# Patient Record
Sex: MALE | Race: WHITE | NOT HISPANIC OR LATINO
[De-identification: names, ages, dates, MRNs, and addresses within clinical notes are randomized per-mention and may not be internally consistent; named-entity substitution may affect disease eponyms.]

---

## 2023-08-04 ENCOUNTER — DASHBOARD ENCOUNTERS (OUTPATIENT)
Age: 83
End: 2023-08-04

## 2023-08-04 ENCOUNTER — OFFICE VISIT (OUTPATIENT)
Dept: URBAN - NONMETROPOLITAN AREA CLINIC 11 | Facility: CLINIC | Age: 83
End: 2023-08-04
Payer: MEDICARE

## 2023-08-04 ENCOUNTER — LAB OUTSIDE AN ENCOUNTER (OUTPATIENT)
Dept: URBAN - NONMETROPOLITAN AREA CLINIC 11 | Facility: CLINIC | Age: 83
End: 2023-08-04

## 2023-08-04 VITALS
OXYGEN SATURATION: 95 % | SYSTOLIC BLOOD PRESSURE: 111 MMHG | TEMPERATURE: 97.7 F | HEART RATE: 81 BPM | WEIGHT: 105.2 LBS | HEIGHT: 71 IN | BODY MASS INDEX: 14.73 KG/M2 | DIASTOLIC BLOOD PRESSURE: 57 MMHG

## 2023-08-04 DIAGNOSIS — R63.4 WEIGHT LOSS: ICD-10-CM

## 2023-08-04 DIAGNOSIS — R10.84 ABDOMINAL CRAMPING, GENERALIZED: ICD-10-CM

## 2023-08-04 DIAGNOSIS — K50.80 CROHN'S COLITIS: ICD-10-CM

## 2023-08-04 PROBLEM — 34000006: Status: ACTIVE | Noted: 2023-08-04

## 2023-08-04 PROBLEM — 386618008: Status: ACTIVE | Noted: 2023-08-04

## 2023-08-04 PROBLEM — 89362005: Status: ACTIVE | Noted: 2023-08-04

## 2023-08-04 PROBLEM — 54586004: Status: ACTIVE | Noted: 2023-08-04

## 2023-08-04 PROCEDURE — 99204 OFFICE O/P NEW MOD 45 MIN: CPT | Performed by: NURSE PRACTITIONER

## 2023-08-04 RX ORDER — AMLODIPINE BESYLATE 2.5 MG/1
TAKE ONE TABLET BY MOUTH ONE TIME DAILY STOP LISINOPRIL TABLET ORAL
Qty: 90 UNSPECIFIED | Refills: 1 | Status: ACTIVE | COMMUNITY

## 2023-08-04 RX ORDER — ASPIRIN 81 MG/1
TAKE ONE TABLET BY MOUTH ONE TIME DAILY TABLET, COATED ORAL
Qty: 90 UNSPECIFIED | Refills: 3 | Status: ACTIVE | COMMUNITY

## 2023-08-04 RX ORDER — ACYCLOVIR 800 MG/1
TAKE ONE TABLET BY MOUTH EVERY 4 HOURS FOR 10 DAYS TABLET ORAL
Qty: 60 UNSPECIFIED | Refills: 0 | Status: ACTIVE | COMMUNITY

## 2023-08-04 RX ORDER — ROSUVASTATIN CALCIUM 20 MG/1
TAKE ONE TABLET BY MOUTH ONE TIME DAILY TABLET, FILM COATED ORAL
Qty: 90 UNSPECIFIED | Refills: 3 | Status: ACTIVE | COMMUNITY

## 2023-08-04 RX ORDER — FLUTICASONE FUROATE, UMECLIDINIUM BROMIDE AND VILANTEROL TRIFENATATE 100; 62.5; 25 UG/1; UG/1; UG/1
INHALE ONE PUFF BY MOUTH ONE TIME DAILY POWDER RESPIRATORY (INHALATION)
Qty: 60 UNSPECIFIED | Refills: 1 | Status: ACTIVE | COMMUNITY

## 2023-08-04 NOTE — HPI-TODAY'S VISIT:
82 year old male with a history of Chron's disease, presents for evaluation of abdominal pain.  The patient was diagnosed in his 30's and he has never been on medication for Crohn's. Lower abdominal pain has occurred since 2023. The pain is dull most of the time, but occasionally it is tight and sharp up to a 9.  Bowel movements are every 1-2 days of loose stools which has been his baseline for years. He had shingles and the  flu in 2023. CT and US in 3/2023 showed simple liver cysts and mild sigmoid wall thickening. The patient quit excessive drining 18 years ago. At that time he lost 30lbs. 3 years ago when his wife ,  he lost 20 lbs. Since 2022, he has lost 20 lbs more. CBC and CMP 3/2023 were unremarkable.

## 2023-08-23 ENCOUNTER — OFFICE VISIT (OUTPATIENT)
Dept: URBAN - METROPOLITAN AREA SURGERY CENTER 28 | Facility: SURGERY CENTER | Age: 83
End: 2023-08-23

## 2023-09-05 ENCOUNTER — TELEPHONE ENCOUNTER (OUTPATIENT)
Dept: URBAN - METROPOLITAN AREA CLINIC 46 | Facility: CLINIC | Age: 83
End: 2023-09-05

## 2023-09-05 ENCOUNTER — OFFICE VISIT (OUTPATIENT)
Dept: URBAN - METROPOLITAN AREA SURGERY CENTER 28 | Facility: SURGERY CENTER | Age: 83
End: 2023-09-05